# Patient Record
Sex: FEMALE | Race: OTHER
[De-identification: names, ages, dates, MRNs, and addresses within clinical notes are randomized per-mention and may not be internally consistent; named-entity substitution may affect disease eponyms.]

---

## 2020-03-26 ENCOUNTER — HOSPITAL ENCOUNTER (OUTPATIENT)
Dept: HOSPITAL 72 - PAN | Age: 60
Discharge: HOME | End: 2020-03-26
Payer: MEDICAID

## 2020-03-26 VITALS — SYSTOLIC BLOOD PRESSURE: 115 MMHG | DIASTOLIC BLOOD PRESSURE: 69 MMHG

## 2020-03-26 VITALS — WEIGHT: 165 LBS | BODY MASS INDEX: 32.39 KG/M2 | HEIGHT: 60 IN

## 2020-03-26 DIAGNOSIS — R10.13: Primary | ICD-10-CM

## 2020-03-26 DIAGNOSIS — K21.9: ICD-10-CM

## 2020-03-26 NOTE — CONSULTATION
DATE OF CONSULTATION:  03/26/2020

CONSULTING PHYSICIAN:  Baltazar Tillman M.D.



CHIEF COMPLAINT:  GI bleeding.



HISTORY OF PRESENT ILLNESS:  This is a 60-year-old female with multiple

medical problems.  Apparently, she has been having rectal bleeding and

also now turns out to be black tarry stools.  She complained of some

epigastric abdominal pain.



PAST MEDICAL HISTORY:

1. GERD.

2. Depression.



PAST SURGICAL HISTORY:

1. History of breast reduction.

2. Shoulder surgery.



MEDICATIONS:  Tylenol, diclofenac and fluoxetine.



FAMILY HISTORY:  Noncontributory.



SOCIAL HISTORY:  The patient denies any tobacco, alcohol, or drug abuse.



ALLERGIES:  No known drug allergies.



REVIEW OF SYSTEMS:  Positive for GERD, abdominal pain, black

stools.



PHYSICAL EXAMINATION:

VITAL SIGNS:  Temperature 99.2, blood pressure 115/69, pulse 70,

respirations 20.

HEENT:  Normocephalic and atraumatic.  Sclerae are anicteric.

NECK:  Supple.  No evidence of obvious lymphadenopathy.

CARDIOVASCULAR:  Regular rate and rhythm.  Plus S1 and S2.

LUNGS:  Clear to auscultation bilaterally.

ABDOMEN:  Positive bowel sounds.  Soft.  Minimal tenderness to palpation in

bilateral lower quadrants.  No rebound.  No guarding.  No peritoneal

sign.

EXTREMITIES:  No cyanosis, no clubbing, no edema.



ASSESSMENT AND PLAN:  This is a 60-year-old female, who seems to be having

possible upper GI bleeding given the black tarry stools.  The patient is

also on diclofenac that can increase the risk of GI bleeding.  The patient

is age 60 and also needs a colonoscopy.  The patient was told to stop the

diclofenac and was given prescription for omeprazole.  She was given

prescription for labs to be done for CBC, BMP, and PT/PTT.  We are going

to send the urgent request for endoscopy and colonoscopy, but meanwhile

the patient was told if she is getting worse to go to the emergency

room.









  ______________________________________________

  Baltazar Tillman M.D.





DR:  SID

D:  03/26/2020 13:31

T:  03/26/2020 19:35

JOB#:  [id number]

CC:

## 2020-04-09 ENCOUNTER — HOSPITAL ENCOUNTER (OUTPATIENT)
Dept: HOSPITAL 72 - PAN | Age: 60
Discharge: HOME | End: 2020-04-09
Payer: MEDICAID

## 2020-04-09 VITALS — DIASTOLIC BLOOD PRESSURE: 65 MMHG | SYSTOLIC BLOOD PRESSURE: 128 MMHG

## 2020-04-09 DIAGNOSIS — K29.80: ICD-10-CM

## 2020-04-09 DIAGNOSIS — K64.9: ICD-10-CM

## 2020-04-09 DIAGNOSIS — R10.9: ICD-10-CM

## 2020-04-09 DIAGNOSIS — K29.70: Primary | ICD-10-CM

## 2020-04-09 PROCEDURE — 99212 OFFICE O/P EST SF 10 MIN: CPT

## 2020-04-09 NOTE — GENERAL PROGRESS NOTE
Assessment/Plan


Assessment/Plan:


s/p EGD and colonoscopy





gastritis


duodentitis


hemorrhoids





ppi


abd us


RTC prn





Subjective


ROS Limited/Unobtainable:  Yes


Allergies:  


Coded Allergies:  


     No Known Allergies (Unverified , 3/26/20)





Objective


General Appearance:  alert


EENT:  normal ENT inspection


Neck:  supple


Cardiovascular:  normal rate


Respiratory/Chest:  lungs clear


Abdomen:  normal bowel sounds, non tender, soft


Extremities:  non-tender











Baltazar Tillman MD Apr 9, 2020 13:22

## 2020-07-24 ENCOUNTER — HOSPITAL ENCOUNTER (OUTPATIENT)
Dept: HOSPITAL 87 - LAB | Age: 60
Discharge: HOME | End: 2020-07-24
Attending: OBSTETRICS & GYNECOLOGY
Payer: MEDICAID

## 2020-07-24 DIAGNOSIS — Z11.59: ICD-10-CM

## 2020-07-24 DIAGNOSIS — Z01.818: Primary | ICD-10-CM

## 2020-07-27 ENCOUNTER — HOSPITAL ENCOUNTER (OUTPATIENT)
Dept: HOSPITAL 87 - OR | Age: 60
Discharge: HOME | End: 2020-07-27
Attending: OBSTETRICS & GYNECOLOGY
Payer: MEDICAID

## 2020-07-27 VITALS — WEIGHT: 158 LBS | HEIGHT: 60 IN | BODY MASS INDEX: 31.02 KG/M2

## 2020-07-27 VITALS — DIASTOLIC BLOOD PRESSURE: 52 MMHG | SYSTOLIC BLOOD PRESSURE: 123 MMHG

## 2020-07-27 DIAGNOSIS — Z91.040: ICD-10-CM

## 2020-07-27 DIAGNOSIS — N95.0: Primary | ICD-10-CM

## 2020-07-27 DIAGNOSIS — Z98.890: ICD-10-CM

## 2020-07-27 DIAGNOSIS — Z88.8: ICD-10-CM

## 2020-07-27 DIAGNOSIS — D25.9: ICD-10-CM

## 2020-07-27 DIAGNOSIS — N72: ICD-10-CM

## 2020-07-27 DIAGNOSIS — R73.03: ICD-10-CM

## 2020-07-27 DIAGNOSIS — Z79.899: ICD-10-CM

## 2020-07-27 LAB
APPEARANCE UR: CLEAR
APTT PPP: 27.8 SEC (ref 23.4–31)
BASOPHILS NFR BLD AUTO: 0.8 % (ref 0–2)
CHLORIDE SERPL-SCNC: 106 MEQ/L (ref 98–107)
COLOR UR: YELLOW
EOSINOPHIL NFR BLD AUTO: 2.3 % (ref 0–5)
ERYTHROCYTE [DISTWIDTH] IN BLOOD BY AUTOMATED COUNT: 13.9 % (ref 11.6–14.6)
HCG UR QL: NEGATIVE
HCT VFR BLD AUTO: 40.2 % (ref 36–48)
HGB BLD-MCNC: 13.5 G/DL (ref 12–16)
HGB UR QL STRIP: (no result)
INR PPP: 1
KETONES UR STRIP-MCNC: NEGATIVE MG/DL
LEUKOCYTE ESTERASE UR QL STRIP: (no result)
LYMPHOCYTES NFR BLD AUTO: 31.9 % (ref 20–50)
MCH RBC QN AUTO: 29.8 PG (ref 28–32)
MCV RBC AUTO: 88.7 FL (ref 81–99)
MONOCYTES NFR BLD AUTO: 9.5 % (ref 2–8)
NEUTROPHILS NFR BLD AUTO: 55.5 % (ref 40–76)
NITRITE UR QL STRIP: NEGATIVE
PH UR STRIP: 6 [PH] (ref 4.5–8)
PLATELET # BLD AUTO: 272 X1000/UL (ref 130–400)
PMV BLD AUTO: 8 FL (ref 7.4–10.4)
PROT UR QL STRIP: NEGATIVE
PROTHROMBIN TIME: 10.4 SEC (ref 9.6–11)
RBC # BLD AUTO: 4.53 MILL/UL (ref 4.2–5.4)
SP GR UR STRIP: 1.02 (ref 1–1.03)
UROBILINOGEN UR STRIP-MCNC: 0.2 E.U./DL (ref 0.2–1)

## 2020-07-27 PROCEDURE — 58552 LAPARO-VAG HYST INCL T/O: CPT

## 2020-07-27 PROCEDURE — 82962 GLUCOSE BLOOD TEST: CPT

## 2020-07-27 PROCEDURE — 85610 PROTHROMBIN TIME: CPT

## 2020-07-27 PROCEDURE — 93005 ELECTROCARDIOGRAM TRACING: CPT

## 2020-07-27 PROCEDURE — 86901 BLOOD TYPING SEROLOGIC RH(D): CPT

## 2020-07-27 PROCEDURE — 80053 COMPREHEN METABOLIC PANEL: CPT

## 2020-07-27 PROCEDURE — 81025 URINE PREGNANCY TEST: CPT

## 2020-07-27 PROCEDURE — 88302 TISSUE EXAM BY PATHOLOGIST: CPT

## 2020-07-27 PROCEDURE — 86850 RBC ANTIBODY SCREEN: CPT

## 2020-07-27 PROCEDURE — 81003 URINALYSIS AUTO W/O SCOPE: CPT

## 2020-07-27 PROCEDURE — 86900 BLOOD TYPING SEROLOGIC ABO: CPT

## 2020-07-27 PROCEDURE — 85025 COMPLETE CBC W/AUTO DIFF WBC: CPT

## 2020-07-27 PROCEDURE — 85730 THROMBOPLASTIN TIME PARTIAL: CPT

## 2020-07-27 PROCEDURE — 36415 COLL VENOUS BLD VENIPUNCTURE: CPT

## 2020-07-27 RX ADMIN — HYDROMORPHONE HYDROCHLORIDE PRN MG: 2 INJECTION, SOLUTION INTRAMUSCULAR; INTRAVENOUS; SUBCUTANEOUS at 20:11

## 2020-07-27 RX ADMIN — HYDROMORPHONE HYDROCHLORIDE PRN MG: 2 INJECTION, SOLUTION INTRAMUSCULAR; INTRAVENOUS; SUBCUTANEOUS at 20:36

## 2020-11-10 ENCOUNTER — HOSPITAL ENCOUNTER (OUTPATIENT)
Dept: HOSPITAL 72 - PAN | Age: 60
Discharge: HOME | End: 2020-11-10
Payer: MEDICAID

## 2020-11-10 VITALS — DIASTOLIC BLOOD PRESSURE: 85 MMHG | SYSTOLIC BLOOD PRESSURE: 136 MMHG

## 2020-11-10 DIAGNOSIS — K64.9: ICD-10-CM

## 2020-11-10 DIAGNOSIS — K29.80: ICD-10-CM

## 2020-11-10 DIAGNOSIS — K76.0: ICD-10-CM

## 2020-11-10 DIAGNOSIS — K29.70: Primary | ICD-10-CM

## 2020-11-10 PROCEDURE — 99212 OFFICE O/P EST SF 10 MIN: CPT

## 2020-11-10 NOTE — GENERAL PROGRESS NOTE
Subjective


ROS Limited/Unobtainable:  Yes


Allergies:  


Coded Allergies:  


     No Known Allergies (Unverified , 3/26/20)





Objective





Last 24 Hour Vital Signs








  Date Time  Temp Pulse Resp B/P (MAP) Pulse Ox O2 Delivery O2 Flow Rate FiO2


 


11/10/20 10:48 98.0 75 16 136/85 95   








General Appearance:  alert


EENT:  normal ENT inspection


Neck:  supple


Cardiovascular:  normal rate


Respiratory/Chest:  chest wall non-tender


Abdomen:  normal bowel sounds, non tender, soft


Extremities:  non-tender





Assessment/Plan


Assessment/Plan:


Assessment/Plan


Assessment/Plan:


s/p EGD and colonoscopy





gastritis


duodenitis


hemorrhoids





ppi


abd us>>> fatty liver


patient was told to loose weight


refill ppi


RTC 3 months











Baltazar Tillman MD             Nov 10, 2020 11:42

## 2021-01-12 ENCOUNTER — HOSPITAL ENCOUNTER (OUTPATIENT)
Dept: HOSPITAL 72 - PAN | Age: 61
Discharge: HOME | End: 2021-01-12
Payer: MEDICAID

## 2021-01-12 DIAGNOSIS — K76.0: ICD-10-CM

## 2021-01-12 DIAGNOSIS — K64.9: ICD-10-CM

## 2021-01-12 DIAGNOSIS — K29.80: ICD-10-CM

## 2021-01-12 DIAGNOSIS — K29.70: Primary | ICD-10-CM

## 2021-01-12 PROCEDURE — 99212 OFFICE O/P EST SF 10 MIN: CPT

## 2021-01-12 NOTE — GENERAL PROGRESS NOTE
Subjective


ROS Limited/Unobtainable:  Yes


Allergies:  


Coded Allergies:  


     No Known Allergies (Unverified , 3/26/20)





Objective


General Appearance:  alert


EENT:  normal ENT inspection


Neck:  supple


Cardiovascular:  normal rate


Respiratory/Chest:  decreased breath sounds


Abdomen:  normal bowel sounds, non tender, soft


Extremities:  non-tender





Assessment/Plan


Assessment/Plan:


Assessment/Plan


Assessment/Plan:


s/p EGD and colonoscopy





gastritis


duodenitis


hemorrhoids





ppi


abd us>>> fatty liver


patient was told to loose weight


add align


RTC 3 months











Baltazar Tillman MD             Jan 12, 2021 14:46